# Patient Record
Sex: FEMALE | Race: WHITE | NOT HISPANIC OR LATINO | Employment: FULL TIME | URBAN - METROPOLITAN AREA
[De-identification: names, ages, dates, MRNs, and addresses within clinical notes are randomized per-mention and may not be internally consistent; named-entity substitution may affect disease eponyms.]

---

## 2017-01-25 ENCOUNTER — OFFICE VISIT (OUTPATIENT)
Dept: OPHTHALMOLOGY | Facility: CLINIC | Age: 57
End: 2017-01-25

## 2017-01-25 DIAGNOSIS — E11.9 DIABETES MELLITUS TYPE 2 WITHOUT RETINOPATHY (H): Primary | ICD-10-CM

## 2017-01-25 DIAGNOSIS — H52.4 PRESBYOPIA: ICD-10-CM

## 2017-01-25 ASSESSMENT — REFRACTION_MANIFEST
OD_AXIS: 30
OS_CYLINDER: +0.75
OS_SPHERE: -2.00
OD_CYLINDER: +1.00
OD_ADD: +2.50
OD_SPHERE: -2.25
OS_ADD: +2.50
OS_AXIS: 165

## 2017-01-25 ASSESSMENT — VISUAL ACUITY
OS_CC: 20/30
OD_CC: J1
OS_CC+: -1
OS_CC: J1
OD_CC: 20/25
METHOD: SNELLEN - LINEAR

## 2017-01-25 ASSESSMENT — REFRACTION_WEARINGRX
OD_SPHERE: -1.75
OS_AXIS: 3
OD_CYLINDER: +1.00
OS_CYLINDER: +0.25
SPECS_TYPE: PAL
OD_ADD: +2.50
OS_SPHERE: -1.75
OD_AXIS: 20

## 2017-01-25 ASSESSMENT — TONOMETRY
OS_IOP_MMHG: 15
OD_IOP_MMHG: 17
IOP_METHOD: TONOPEN

## 2017-01-25 ASSESSMENT — CONF VISUAL FIELD
OD_NORMAL: 1
OS_NORMAL: 1

## 2017-01-25 ASSESSMENT — CUP TO DISC RATIO
OD_RATIO: 0.2
OS_RATIO: 0.2

## 2017-01-25 ASSESSMENT — EXTERNAL EXAM - RIGHT EYE: OD_EXAM: NORMAL

## 2017-01-25 ASSESSMENT — EXTERNAL EXAM - LEFT EYE: OS_EXAM: NORMAL

## 2017-01-25 ASSESSMENT — SLIT LAMP EXAM - LIDS
COMMENTS: NORMAL
COMMENTS: NORMAL

## 2017-01-25 NOTE — MR AVS SNAPSHOT
After Visit Summary   1/25/2017    Nhung Russell    MRN: [id number]           Patient Information     Date Of Birth          1960        Visit Information        Provider Department      1/25/2017 8:00 AM Kelle Cifuentes MD [facility]        Today's Diagnoses     Diabetes mellitus type 2 without retinopathy (H)    -  1     Presbyopia           Care Instructions    Patient will return to clinic in 1 year with repeat dilated eye exam.          Follow-ups after your visit        Who to contact     Please call your clinic at [phone] to:    Ask questions about your health    Make or cancel appointments    Discuss your medicines    Learn about your test results    Speak to your doctor   If you have compliments or concerns about an experience at your clinic, or if you wish to file a complaint, please contact [facility] Patient Relations at [phone] or email us at [email or web address]         Additional Information About Your Visit        Care EveryWhere ID     This is your Care EveryWhere ID. This could be used by other organizations to access your [place] medical records  [id number]         Blood Pressure from Last 3 Encounters:   No data found for BP    Weight from Last 3 Encounters:   No data found for Wt              We Performed the Following     REFRACTION        Primary Care Provider Office Phone # Fax #    Petty Carrillo [phone]       [facility] [address] [place] [address]        Thank you!     Thank you for choosing [facility]  for your care. Our goal is always to provide you with excellent care. Hearing back from our patients is one way we can continue to improve our services. Please take a few minutes to complete the written survey that you may receive in the mail after your visit with us. Thank you!             Your Updated  Medication List - Protect others around you: Learn how to safely use, store and throw away your medicines at www.disposemymeds.org.          This list is accurate as of: 1/25/17  8:47 AM.  Always use your most recent med list.                   Brand Name Dispense Instructions for use    GLIPIZIDE PO          LIPITOR PO          METFORMIN HCL PO

## 2017-01-25 NOTE — NURSING NOTE
Chief Complaints and History of Present Illnesses   Patient presents with     Eye Exam For Diabetes     No VA concerns     HPI    Symptoms:              Comments:  No results found for this basename: a1c  6.9 was last A1C:  No VA concerns  Gosia DEVLIN 8:16 AM January 25, 2017

## 2017-01-25 NOTE — PROGRESS NOTES
1)DM s DR  2)ZULEIKA on CPAP  3)Presbyopia  4)NS OU    Patient will return to clinic in 1 year with repeat dilated eye exam.

## 2018-01-30 ENCOUNTER — OFFICE VISIT (OUTPATIENT)
Dept: OPHTHALMOLOGY | Facility: CLINIC | Age: 58
End: 2018-01-30
Payer: COMMERCIAL

## 2018-01-30 DIAGNOSIS — H52.4 PRESBYOPIA OF BOTH EYES: ICD-10-CM

## 2018-01-30 DIAGNOSIS — H52.13 MYOPIA, BILATERAL: ICD-10-CM

## 2018-01-30 DIAGNOSIS — E11.9 TYPE 2 DIABETES MELLITUS WITHOUT OPHTHALMIC MANIFESTATIONS (H): Primary | ICD-10-CM

## 2018-01-30 DIAGNOSIS — H25.13 NUCLEAR SCLEROTIC CATARACT OF BOTH EYES: ICD-10-CM

## 2018-01-30 DIAGNOSIS — H04.123 DRY EYES, BILATERAL: ICD-10-CM

## 2018-01-30 ASSESSMENT — REFRACTION_WEARINGRX
OD_CYLINDER: +1.25
OD_ADD: +2.50
OS_SPHERE: -1.25
OS_AXIS: 005
OD_AXIS: 018
OS_CYLINDER: +0.25
SPECS_TYPE: PAL
OD_SPHERE: -1.50
OS_ADD: +2.50

## 2018-01-30 ASSESSMENT — CONF VISUAL FIELD
OS_NORMAL: 1
METHOD: COUNTING FINGERS
OD_NORMAL: 1

## 2018-01-30 ASSESSMENT — REFRACTION_MANIFEST
OS_ADD: +2.50
OS_CYLINDER: +0.75
OS_AXIS: 165
OD_CYLINDER: +1.00
OS_SPHERE: -2.00
OD_SPHERE: -2.50
OD_ADD: +2.50
OD_AXIS: 030

## 2018-01-30 ASSESSMENT — VISUAL ACUITY
OS_CC: 20/25
CORRECTION_TYPE: GLASSES
OD_CC+: -1
OD_CC: 20/25
METHOD: SNELLEN - LINEAR

## 2018-01-30 ASSESSMENT — SLIT LAMP EXAM - LIDS
COMMENTS: NORMAL
COMMENTS: NORMAL

## 2018-01-30 ASSESSMENT — CUP TO DISC RATIO
OD_RATIO: 0.2
OS_RATIO: 0.2

## 2018-01-30 ASSESSMENT — EXTERNAL EXAM - LEFT EYE: OS_EXAM: NORMAL

## 2018-01-30 ASSESSMENT — EXTERNAL EXAM - RIGHT EYE: OD_EXAM: NORMAL

## 2018-01-30 ASSESSMENT — TONOMETRY
OS_IOP_MMHG: 15
IOP_METHOD: TONOPEN
OD_IOP_MMHG: 18

## 2018-01-30 NOTE — MR AVS SNAPSHOT
After Visit Summary   2018    Nhung Russell    MRN: [id number]           Patient Information     Date Of Birth          1960        Visit Information        Provider Department      2018 8:00 AM Lupe Soliz MD [facility]        Today's Diagnoses     Type 2 diabetes mellitus without ophthalmic manifestations (H) - Both Eyes    -  1    Dry eyes, bilateral - Both Eyes        Nuclear sclerotic cataract of both eyes - Both Eyes        Myopia, bilateral - Both Eyes        Presbyopia of both eyes - Both Eyes           Follow-ups after your visit        Follow-up notes from your care team     Return in about 1 month (around 2018) for follow up- dry eye, recheck mrx from last visit since dry eye caused difficulty last exam.      Who to contact     Please call your clinic at [phone] to:    Ask questions about your health    Make or cancel appointments    Discuss your medicines    Learn about your test results    Speak to your doctor   If you have compliments or concerns about an experience at your clinic, or if you wish to file a complaint, please contact [facility] Patient Relations at [phone] or email us at [email or web address]         Additional Information About Your Visit        MyChart Information     [employer] is an electronic gateway that provides easy, online access to your medical records. With [employer], you can request a clinic appointment, read your test results, renew a prescription or communicate with your care team.     To sign up for [employer] visit the website at [email or web address]   You will be asked to enter the access code listed below, as well as some personal information. Please follow the directions to create your username and password.     Your access code is: [email or web address]  Expires: 2018  6:30 AM     Your access code will  in 90 days. If you need help or a  new code, please contact your [facility] or call [phone] for assistance.        Care EveryWhere ID     This is your Care EveryWhere ID. This could be used by other organizations to access your [place] medical records  [id number]         Blood Pressure from Last 3 Encounters:   No data found for BP    Weight from Last 3 Encounters:   No data found for Wt              Today, you had the following     No orders found for display       Primary Care Provider Office Phone # Fax #    Petty Carrillo [phone]       [facility] [address] [place] [address]        Equal Access to Services     [facility]: Hadii aad ku hadasho Soomaali, waaxda luqadaha, qaybta kaalmada adeegyada, waxay idiin hayaan adeeg kharabarber randolph . So [facility] [phone].    ATENCIÓN: Si habla español, tiene a cunningham disposición servicios gratuitos de asistencia lingüística. Llame al [phone].    We comply with applicable federal civil rights laws and [place] laws. We do not discriminate on the basis of race, color, national origin, age, disability, sex, sexual orientation, or gender identity.            Thank you!     Thank you for choosing [facility]  for your care. Our goal is always to provide you with excellent care. Hearing back from our patients is one way we can continue to improve our services. Please take a few minutes to complete the written survey that you may receive in the mail after your visit with us. Thank you!             Your Updated Medication List - Protect others around you: Learn how to safely use, store and throw away your medicines at www.disposemymeds.org.          This list is accurate as of 1/30/18  1:28 PM.  Always use your most recent med list.                   Brand Name Dispense Instructions for use Diagnosis    GLIPIZIDE PO           LIPITOR PO           METFORMIN HCL PO           VICTOZA SC      Inject  Subcutaneous daily Pt states uses 0.6

## 2018-01-30 NOTE — NURSING NOTE
Chief Complaints and History of Present Illnesses   Patient presents with     Diabetic Eye Exam     HPI    Affected eye(s):  Both   Symptoms:     No decreased vision   No floaters   No flashes   Dryness (Comment: notes much increased dryness within the last 2 to 4 weeks / not using art tears)      Duration:  1 year   Frequency:  Constant       Do you have eye pain now?:  No      Comments:  A1C: 6.5 about 2 months  Avg FBS: about 130.  Jeannie EL 8:15 AM 01/30/2018

## 2018-01-30 NOTE — LETTER
Regarding: Nhung Russell    YOB: 1960    MRN: [id number]      Dear Colleague,     It was my pleasure to evaluate Nhung Russell on 1/30/2018 in our eye clinic. I have the last reported hemoglobin A1c as No results found for: A1C.    Pertinent exam findings today included:  Visual Acuity (Snellen - Linear)      Right Left   Dist cc 20/25 -1 20/25       Correction:  Glasses    Encouraged blinking         Tonometry (Tonopen, 8:11 AM)      Right Left   Pressure 18 15            Main Ophthalmology Exam     External Exam      Right Left    External Normal Normal      Slit Lamp Exam      Right Left    Lids/Lashes Normal Normal    Conjunctiva/Sclera White and quiet, diffuse conj staining White and quiet, diffuse conj staining    Cornea Clear, rapid TBUT Clear, rapid TBUT    Anterior Chamber Deep and quiet Deep and quiet    Iris Round and reactive Round and reactive    Lens tr NS tr NS    Vitreous Normal Normal      Fundus Exam      Right Left    Disc Normal Normal    C/D Ratio 0.2 0.2    Macula Normal Normal    Vessels Normal Normal    Periphery Normal, no DR Normal, no DR                There was no diabetic retinopathy seen on dilated exam today.      I have encouraged Nhung to continue working with you to maintain tight control of blood glucose and blood pressure.  Thank you for the opportunity to care for Nhung, and if you would like to discuss anything further, please do not hesitate to contact me.  I have asked her to return in about 1 month for dry eye follow-up and refraction for glasses and then in a year for her dilated fundus exam for diabetes mellitus.           Best regards,          Lupe Soliz MD        Comprehensive Ophthalmology          [facility]

## 2018-01-30 NOTE — PROGRESS NOTES
HPI  Nhung Russell is a 57 year old here for comprehensive eye exam for diabetes mellitus.  Happy with vision both eyes with current glasses.  Denies eye pain or flashes/floaters.  Has occasional dry eye sensation and irritation both eyes. Worse over the last few months, has not tried artificial tear drops. Diabetes has been well controlled, reports a hemoglobin a1c as 6.5%.       PMH: diabetes mellitus 2 on oral meds, hyperlipidemia   POH: Glasses for myopia/astig/presbyopia, no surgery, no trauma  Oc Meds: none  FH: Denies any glaucoma, age related macular degeneration, or other known eye diseases         Assessment & Plan        (E11.9) Type 2 diabetes mellitus without ophthalmic manifestations (H) - Both Eyes  (primary encounter diagnosis)  Comment: Diabetes Mellitus 2 w/o retinopathy   Controlled with 6.5% A1C  Plan:   Discussed the importance of tight blood glucose, blood pressure, and cholesterol  control in the prevention of diabetic retinopathy. Recommend yearly dilated eye exam. Letter to PCP sent.    (H04.123) Dry eyes, bilateral - Both Eyes  Comment: mild, rapid tear break up time, no cornea signs  Plan: trial of artificial tear drops four times a day as needed and preservative-free artificial tears if more than 6 x per day.  Recheck in 1 month.    (H25.13) Nuclear sclerotic cataract of both eyes - Both Eyes  Comment: early, not visually significant, counseled patient that it could cause mild glare/halos and refractive changes over time that can be compensated with new glasses   Plan: new prescription for glasses would help a little, recheck after dry eye addressed    (H52.13) Myopia, bilateral - Both Eyes/(H52.4) Presbyopia OU - Both Eyes  Comment: Mild change in prescription- needs to be rechecked and duochrome once eyes more comfortable from dry eye standpoint (unsure of her answers on manifest refraction)  Plan:  Confirm manifest refraction and duochrome next  visit      -----------------------------------------------------------------------------------       Patient disposition:   Return in about 1 month (around 2/28/2018) for follow up- dry eye, recheck mrx from last visit since dry eye caused difficulty last exam. and patient to call sooner as needed.      Complete documentation of historical and exam elements from today's encounter can be found in the full encounter summary report (not reduplicated in this progress note). I personally obtained the chief complaint(s) and history of present illness.  I have confirmed and edited as necessary the CC, HPI, PMH/PSH, social history, FMH, ROS, and exam/neuro findings as obtained by the technician or others. I have examined this patient myself and I personally viewed the image(s) and studies listed above and the documentation reflects my findings and interpretation.

## 2019-01-29 ENCOUNTER — OFFICE VISIT (OUTPATIENT)
Dept: OPHTHALMOLOGY | Facility: CLINIC | Age: 59
End: 2019-01-29
Payer: COMMERCIAL

## 2019-01-29 DIAGNOSIS — H25.13 NUCLEAR SCLEROTIC CATARACT OF BOTH EYES: ICD-10-CM

## 2019-01-29 DIAGNOSIS — H04.123 DRY EYES, BILATERAL: ICD-10-CM

## 2019-01-29 DIAGNOSIS — E11.9 TYPE 2 DIABETES MELLITUS WITHOUT OPHTHALMIC MANIFESTATIONS (H): Primary | ICD-10-CM

## 2019-01-29 DIAGNOSIS — H43.813 PVD (POSTERIOR VITREOUS DETACHMENT), BILATERAL: ICD-10-CM

## 2019-01-29 DIAGNOSIS — H52.13 MYOPIA, BILATERAL: ICD-10-CM

## 2019-01-29 ASSESSMENT — REFRACTION_WEARINGRX
OS_ADD: +2.50
OS_CYLINDER: +0.25
OS_AXIS: 005
SPECS_TYPE: PAL
OD_AXIS: 018
OD_ADD: +2.50
OS_SPHERE: -1.25
OD_SPHERE: -1.50
OD_CYLINDER: +1.25

## 2019-01-29 ASSESSMENT — SLIT LAMP EXAM - LIDS
COMMENTS: NORMAL
COMMENTS: NORMAL

## 2019-01-29 ASSESSMENT — CONF VISUAL FIELD
OS_NORMAL: 1
METHOD: COUNTING FINGERS
OD_NORMAL: 1

## 2019-01-29 ASSESSMENT — REFRACTION_MANIFEST
OD_AXIS: 023
OS_AXIS: 174
OS_ADD: +2.50
OS_CYLINDER: +0.25
OD_SPHERE: -2.50
OS_SPHERE: -2.25
OD_CYLINDER: +1.50
OD_ADD: +2.50

## 2019-01-29 ASSESSMENT — CUP TO DISC RATIO
OD_RATIO: 0.2
OS_RATIO: 0.2

## 2019-01-29 ASSESSMENT — VISUAL ACUITY
OD_CC: 20/30-2
OS_CC: 20/40-
CORRECTION_TYPE: GLASSES
METHOD: SNELLEN - LINEAR

## 2019-01-29 ASSESSMENT — TONOMETRY
IOP_METHOD: TONOPEN
OD_IOP_MMHG: 17
OS_IOP_MMHG: 18

## 2019-01-29 ASSESSMENT — EXTERNAL EXAM - RIGHT EYE: OD_EXAM: NORMAL

## 2019-01-29 ASSESSMENT — EXTERNAL EXAM - LEFT EYE: OS_EXAM: NORMAL

## 2019-01-29 NOTE — PROGRESS NOTES
HPI  Nhung Russell is a 58 year old here for comprehensive eye exam for diabetes mellitus.  Blurry vision both eyes with current glasses for distance.  Denies eye pain or flashes/floaters.  Has occasional dry eye sensation.  Diabetes has been reasonably well controlled, reports a hemoglobin a1c as 67.2%.       PMH: diabetes mellitus 2 on oral meds, hyperlipidemia   POH: Glasses for myopia/astig/presbyopia, no surgery, no trauma  Oc Meds: none  FH: Denies any glaucoma, age related macular degeneration, or other known eye diseases         Assessment & Plan        (E11.9) Type 2 diabetes mellitus without ophthalmic manifestations (H) - Both Eyes  (primary encounter diagnosis)  Comment: Diabetes Mellitus 2 w/o retinopathy   Controlled with 7.2% A1C  Plan:   Discussed the importance of tight blood glucose, blood pressure, and cholesterol  control in the prevention of diabetic retinopathy. Recommend yearly dilated eye exam. Letter to PCP sent.    (H52.13) Myopia, bilateral - Both Eyes/(H52.4) Presbyopia OU - Both Eyes  Comment: Moderate change in prescription improves visual acuity   Plan:  Manifest refraction done and prescription for glasses given     (H25.13) Nuclear sclerotic cataract of both eyes - Both Eyes  Comment: early, not visually significant, counseled patient that it could cause mild glare/halos and refractive changes over time that can be compensated with new glasses   Plan: manifest refraction done and prescription for glasses given     (H04.123) Dry eyes, bilateral - Both Eyes  Comment: mild, rapid tear break up time, no cornea signs  Plan: trial of artificial tear drops four times a day as needed and preservative-free artificial tears if more than 6 x per day.  Patient liked Retaine.    (H43.099) PVD (posterior vitreous detachment), unspecified laterality - right Eye  Comment:  No Leonardo's sign, retinal tears, or detachment seen on exam today. ?new, patient asymptomatic   Plan:  Natural history of  posterior vitreous detachment as well as retinal tear/detachment symptoms discussed with patient.  Told to call for prompt dilated exam if these symptoms occur.      -----------------------------------------------------------------------------------       Patient disposition:   Return in about 1 year (around 1/29/2020) for Comprehensive Exam. and patient to call sooner as needed.      Complete documentation of historical and exam elements from today's encounter can be found in the full encounter summary report (not reduplicated in this progress note). I personally obtained the chief complaint(s) and history of present illness.  I have confirmed and edited as necessary the CC, HPI, PMH/PSH, social history, FMH, ROS, and exam/neuro findings as obtained by the technician or others. I have examined this patient myself and I personally viewed the image(s) and studies listed above and the documentation reflects my findings and interpretation.     Lupe Soliz

## 2019-01-29 NOTE — LETTER
Regarding: Nhung Russell    YOB: 1960    MRN: [id number]      Dear Dr. Carrillo,     It was my pleasure to evaluate Nhung Russell on 1/29/2019 in our eye clinic. I have the last reported hemoglobin A1c as 7.2%    Pertinent exam findings today included:  Visual Acuity (Snellen - Linear)       Right Left    Dist cc 20/30-2 20/40-    Correction:  Glasses       improves to 20/20 each eye with manifest refraction   Tonometry (Tonopen, 8:50 AM)       Right Left    Pressure 17 18         Main Ophthalmology Exam     External Exam       Right Left    External Normal Normal          Slit Lamp Exam       Right Left    Lids/Lashes Normal Normal    Conjunctiva/Sclera White and quiet White and quiet    Cornea Clear, rapid TBUT Clear, rapid TBUT    Anterior Chamber Deep and quiet Deep and quiet    Iris Round and reactive Round and reactive    Lens tr NS tr NS    Vitreous Posterior vitreous detachment Normal          Fundus Exam       Right Left    Disc Normal Normal    C/D Ratio 0.2 0.2    Macula Normal Normal    Vessels Normal Normal    Periphery Normal, no DR Normal, no DR                There was no diabetic retinopathy seen on dilated exam today.      I have encouraged Nhung to continue working with you to maintain tight control of blood glucose and blood pressure.  Thank you for the opportunity to care for Nhung, and if you would like to discuss anything further, please do not hesitate to contact me.  I have asked her to return to clinic in about a year.          Best regards,          Lupe Soliz MD        Comprehensive Ophthalmology          [facility]

## 2019-05-29 ENCOUNTER — OFFICE VISIT (OUTPATIENT)
Dept: OPHTHALMOLOGY | Facility: CLINIC | Age: 59
End: 2019-05-29
Attending: OPHTHALMOLOGY
Payer: COMMERCIAL

## 2019-05-29 DIAGNOSIS — H11.31 SUBCONJUNCTIVAL HEMORRHAGE, RIGHT: Primary | ICD-10-CM

## 2019-05-29 PROCEDURE — G0463 HOSPITAL OUTPT CLINIC VISIT: HCPCS | Mod: ZF

## 2019-05-29 ASSESSMENT — CUP TO DISC RATIO
OD_RATIO: 0.2
OS_RATIO: 0.2

## 2019-05-29 ASSESSMENT — VISUAL ACUITY
OD_CC+: -2
OD_CC: 20/20
CORRECTION_TYPE: GLASSES
OS_CC: 20/30
METHOD: SNELLEN - LINEAR

## 2019-05-29 ASSESSMENT — CONF VISUAL FIELD
OD_NORMAL: 1
METHOD: COUNTING FINGERS
OS_NORMAL: 1

## 2019-05-29 ASSESSMENT — TONOMETRY
OS_IOP_MMHG: 16
OD_IOP_MMHG: 14
IOP_METHOD: TONOPEN

## 2019-05-29 ASSESSMENT — EXTERNAL EXAM - LEFT EYE: OS_EXAM: NORMAL

## 2019-05-29 ASSESSMENT — SLIT LAMP EXAM - LIDS
COMMENTS: NORMAL
COMMENTS: NORMAL

## 2019-05-29 ASSESSMENT — EXTERNAL EXAM - RIGHT EYE: OD_EXAM: NORMAL

## 2019-05-29 NOTE — NURSING NOTE
Chief Complaints and History of Present Illnesses   Patient presents with     Red Eye Right Eye     Chief Complaint(s) and History of Present Illness(es)     Red Eye Right Eye     Laterality: right eye    Characteristics: red blood on eye    Pain scale: 0/10    Frequency: constantly    Duration: days    Treatments tried: artificial tears              Comments     Started Friday night after rubbing it.  States that it has decreased a bit but there are some spot that are still there.  +FBS  +RUL is a bit swollen  Janet Amaya COT 7:57 AM May 29, 2019

## 2019-05-29 NOTE — PROGRESS NOTES
HPI  Nhung Russell is a 58 year old here for red eye after rubbing her eye over the weekend. Last eye exam 1/2019 with Dr. Soliz. Denies eye pain or flashes/floaters.  Has occasional dry eye and FBS.  Diabetes has been reasonably well controlled.      Takes baby aspirin.      PMH: diabetes mellitus 2 on oral meds, hyperlipidemia   POH: Glasses for myopia/astig/presbyopia, no surgery, no trauma  Oc Meds: none  FH: Denies any glaucoma, age related macular degeneration, or other known eye diseases         Assessment & Plan     Subconjunctival hemorrhage  OD   - ATs    - Continue Aspirin per PCP   - Return as needed    NOT ADDRESSED TODAY:  (E11.9) Type 2 diabetes mellitus without ophthalmic manifestations (H) - Both Eyes  (primary encounter diagnosis)  Comment: Diabetes Mellitus 2 w/o retinopathy   Controlled with 7.2% A1C  Plan:   Discussed the importance of tight blood glucose, blood pressure, and cholesterol  control in the prevention of diabetic retinopathy. Recommend yearly dilated eye exam. Letter to PCP sent.    (H52.13) Myopia, bilateral - Both Eyes/(H52.4) Presbyopia OU - Both Eyes  Comment: Moderate change in prescription improves visual acuity   Plan:  Manifest refraction done and prescription for glasses given     (H25.13) Nuclear sclerotic cataract of both eyes - Both Eyes  Comment: early, not visually significant, counseled patient that it could cause mild glare/halos and refractive changes over time that can be compensated with new glasses   Plan: manifest refraction done and prescription for glasses given     (H04.123) Dry eyes, bilateral - Both Eyes  Comment: mild, rapid tear break up time, no cornea signs  Plan: trial of artificial tear drops four times a day as needed and preservative-free artificial tears if more than 6 x per day.  Patient liked Retaine.    (H43.819) PVD (posterior vitreous detachment), unspecified laterality - right Eye  Comment:  No Leonardo's sign, retinal tears, or  detachment seen on exam today. ?new, patient asymptomatic   Plan:  Natural history of posterior vitreous detachment as well as retinal tear/detachment symptoms discussed with patient.  Told to call for prompt dilated exam if these symptoms occur.      -----------------------------------------------------------------------------------       Patient disposition:   Return in about 1 year (around 5/29/2020) for Sooner PRN. and patient to call sooner as needed.      Abimael Chirinos M.D.  PGY-3, Ophthalmology    Teaching statement:  Complete documentation of historical and exam elements from today's encounter can be found in the full encounter summary report (not reduplicated in this progress note). I personally obtained the chief complaint(s) and history of present illness.  I confirmed and edited as necessary the review of systems, past medical/surgical history, family history, social history, and examination findings as documented by others; and I examined the patient myself. I personally reviewed the relevant tests, images, and reports as documented above.     I formulated and edited as necessary the assessment and plan and discussed the findings and management plan with the patient and family.    Samanta Cardenas MD  Comprehensive Ophthalmology & Ocular Pathology  Department of Ophthalmology and Visual Neurosciences  [email or web address]  Pager [phone]

## 2020-11-23 ENCOUNTER — OFFICE VISIT (OUTPATIENT)
Dept: OPHTHALMOLOGY | Facility: CLINIC | Age: 60
End: 2020-11-23
Payer: COMMERCIAL

## 2020-11-23 DIAGNOSIS — H43.813 PVD (POSTERIOR VITREOUS DETACHMENT), BILATERAL: ICD-10-CM

## 2020-11-23 DIAGNOSIS — E11.9 TYPE 2 DIABETES MELLITUS WITHOUT OPHTHALMIC MANIFESTATIONS (H): Primary | ICD-10-CM

## 2020-11-23 DIAGNOSIS — H04.123 DRY EYES, BILATERAL: ICD-10-CM

## 2020-11-23 DIAGNOSIS — H52.13 MYOPIA, BILATERAL: ICD-10-CM

## 2020-11-23 PROCEDURE — 92015 DETERMINE REFRACTIVE STATE: CPT | Performed by: OPHTHALMOLOGY

## 2020-11-23 PROCEDURE — 92014 COMPRE OPH EXAM EST PT 1/>: CPT | Performed by: OPHTHALMOLOGY

## 2020-11-23 RX ORDER — AMLODIPINE BESYLATE 2.5 MG/1
TABLET ORAL
COMMUNITY
Start: 2020-10-20

## 2020-11-23 RX ORDER — ROSUVASTATIN CALCIUM 20 MG/1
TABLET, COATED ORAL
COMMUNITY
Start: 2020-09-15

## 2020-11-23 ASSESSMENT — REFRACTION_WEARINGRX
OD_SPHERE: -2.50
OD_AXIS: 023
OS_ADD: +2.50
OD_CYLINDER: +1.50
SPECS_TYPE: PAL
OS_CYLINDER: +0.25
OD_ADD: +2.50
OS_SPHERE: -2.25
OS_AXIS: 174

## 2020-11-23 ASSESSMENT — VISUAL ACUITY
OD_CC+: -2
OD_CC: 20/20
OS_CC: 20/20
OS_CC+: -2
METHOD: SNELLEN - LINEAR
CORRECTION_TYPE: GLASSES

## 2020-11-23 ASSESSMENT — CUP TO DISC RATIO
OS_RATIO: 0.2
OD_RATIO: 0.2

## 2020-11-23 ASSESSMENT — EXTERNAL EXAM - RIGHT EYE: OD_EXAM: NORMAL

## 2020-11-23 ASSESSMENT — TONOMETRY
OD_IOP_MMHG: 16
IOP_METHOD: TONOPEN
OS_IOP_MMHG: 15

## 2020-11-23 ASSESSMENT — CONF VISUAL FIELD
METHOD: COUNTING FINGERS
OS_NORMAL: 1
OD_NORMAL: 1

## 2020-11-23 ASSESSMENT — SLIT LAMP EXAM - LIDS
COMMENTS: DERMATOCHALASIS
COMMENTS: DERMATOCHALASIS

## 2020-11-23 ASSESSMENT — EXTERNAL EXAM - LEFT EYE: OS_EXAM: NORMAL

## 2020-11-23 NOTE — PROGRESS NOTES
HPI  Nhung Russell is a 60 year old here for comprehensive eye exam for diabetes mellitus.  Slight blurry vision both eyes with current glasses for distance.  In August she states that she started noticing a few more floaters (she is not sure which eye).  Both eyes seem intermittently dry.  Using artificial tears does resolve the discomfort.  Her last A1C was 10.1 on 9/10/2020.     PMH: diabetes mellitus 2 on oral meds, hyperlipidemia   POH: Glasses for myopia/astig/presbyopia, no surgery, no trauma  Oc Meds: none  FH: Denies any glaucoma, age related macular degeneration, or other known eye diseases         Assessment & Plan     (E11.9) Type 2 diabetes mellitus without ophthalmic manifestations (H) - Both Eyes  (primary encounter diagnosis)  Comment: Diabetes Mellitus 2 w/o retinopathy  Plan:   Discussed the importance of tight blood glucose, blood pressure, and cholesterol control in the prevention of diabetic retinopathy. Recommend yearly dilated eye exam. Letter to PCP sent.    (H25.13) Nuclear sclerotic cataract of both eyes - Both Eyes  Comment: early, not visually significant, cortical cataract right eye, counseled patient that it could cause mild glare/halos and refractive changes over time that can be compensated with new glasses   Plan: follow    (H52.13) Myopia, bilateral - Both Eyes  Comment: good visual acuity    Plan:  Manifest refraction done and prescription for glasses given    (H04.123) Dry eyes, bilateral - Both Eyes  Comment: mild  Plan: continue artificial tear drops as needed     (H43.819) PVD (posterior vitreous detachment), unspecified laterality - right Eye  Comment:  Old right eye - no new Leonardo's sign, retinal tears, or detachment seen on exam today both eyes .   Plan:  Natural history of posterior vitreous detachment as well as retinal tear/detachment symptoms discussed with patient.  Told to call for prompt dilated exam if these symptoms  occur.      -----------------------------------------------------------------------------------       Patient disposition:   Return in about 1 year (around 11/23/2021) for Comprehensive Exam. and patient to call sooner as needed.      Complete documentation of historical and exam elements from today's encounter can be found in the full encounter summary report (not reduplicated in this progress note). I personally obtained the chief complaint(s) and history of present illness.  I have confirmed and edited as necessary the CC, HPI, PMH/PSH, social history, FMH, ROS, and exam/neuro findings as obtained by the technician or others. I have examined this patient myself and I personally viewed the image(s) and studies listed above and the documentation reflects my findings and interpretation.     Lupe Soliz

## 2020-11-23 NOTE — LETTER
Regarding: Nhung Russell    YOB: 1960    MRN: [id number]      Dear Colleague,     It was my pleasure to evaluate Nhung Russell on 11/23/2020 in our eye clinic. I have the last reported hemoglobin A1c as 10%    Pertinent exam findings today included:  Visual Acuity (Snellen - Linear)       Right Left    Dist cc 20/20 -2 20/20 -2    Correction: Glasses         Tonometry (Tonopen, 9:00 AM)       Right Left    Pressure 16 15         Main Ophthalmology Exam     External Exam       Right Left    External Normal Normal          Slit Lamp Exam       Right Left    Lids/Lashes Dermatochalasis Dermatochalasis    Conjunctiva/Sclera White and quiet White and quiet    Cornea Clear, rapid TBUT Clear, rapid TBUT    Anterior Chamber Deep and quiet Deep and quiet    Iris Dilated Dilated    Lens 1+ Nuclear sclerosis cataract inf ACC 1+ Nuclear sclerosis cataract    Vitreous Posterior vitreous detachment Normal          Fundus Exam       Right Left    Disc Normal Normal    C/D Ratio 0.2 0.2    Macula Normal Normal    Vessels Normal Normal    Periphery Normal, attached, no DR  Normal, attached, no DR                 There was no diabetic retinopathy seen on dilated exam today.      I have encouraged Nhung to continue working with you to maintain tight control of blood glucose and blood pressure.  Thank you for the opportunity to care for Nhung, and if you would like to discuss anything further, please do not hesitate to contact me.  I have asked her to return to clinic in a year.          Best regards,          Lupe Soliz MD        Comprehensive Ophthalmology                [facility]        [facility]

## 2021-12-02 ENCOUNTER — OFFICE VISIT (OUTPATIENT)
Dept: OPHTHALMOLOGY | Facility: CLINIC | Age: 61
End: 2021-12-02
Payer: COMMERCIAL

## 2021-12-02 DIAGNOSIS — H43.813 PVD (POSTERIOR VITREOUS DETACHMENT), BILATERAL: ICD-10-CM

## 2021-12-02 DIAGNOSIS — E11.9 TYPE 2 DIABETES MELLITUS WITHOUT OPHTHALMIC MANIFESTATIONS (H): Primary | ICD-10-CM

## 2021-12-02 DIAGNOSIS — H02.834 DERMATOCHALASIS OF BOTH UPPER EYELIDS: ICD-10-CM

## 2021-12-02 DIAGNOSIS — H25.13 NUCLEAR SCLEROTIC CATARACT OF BOTH EYES: ICD-10-CM

## 2021-12-02 DIAGNOSIS — H52.13 MYOPIA, BILATERAL: ICD-10-CM

## 2021-12-02 DIAGNOSIS — H02.831 DERMATOCHALASIS OF BOTH UPPER EYELIDS: ICD-10-CM

## 2021-12-02 DIAGNOSIS — H04.123 DRY EYES, BILATERAL: ICD-10-CM

## 2021-12-02 PROCEDURE — 92014 COMPRE OPH EXAM EST PT 1/>: CPT | Performed by: OPHTHALMOLOGY

## 2021-12-02 PROCEDURE — 92015 DETERMINE REFRACTIVE STATE: CPT | Performed by: OPHTHALMOLOGY

## 2021-12-02 RX ORDER — GLIPIZIDE 10 MG/1
TABLET, FILM COATED, EXTENDED RELEASE ORAL
COMMUNITY
Start: 2021-12-01

## 2021-12-02 RX ORDER — INSULIN GLARGINE 100 [IU]/ML
20 INJECTION, SOLUTION SUBCUTANEOUS
COMMUNITY
Start: 2021-07-21 | End: 2022-11-19

## 2021-12-02 RX ORDER — SEMAGLUTIDE 1.34 MG/ML
INJECTION, SOLUTION SUBCUTANEOUS
COMMUNITY
Start: 2021-10-18

## 2021-12-02 RX ORDER — BLOOD SUGAR DIAGNOSTIC
STRIP MISCELLANEOUS
COMMUNITY
Start: 2021-09-24

## 2021-12-02 RX ORDER — INSULIN GLARGINE 100 [IU]/ML
INJECTION, SOLUTION SUBCUTANEOUS
COMMUNITY
Start: 2021-11-13

## 2021-12-02 ASSESSMENT — VISUAL ACUITY
OD_CC: 20/25
METHOD: SNELLEN - LINEAR
OS_CC: 20/20 SLOW
CORRECTION_TYPE: GLASSES
OD_CC+: -3

## 2021-12-02 ASSESSMENT — REFRACTION_MANIFEST
OD_ADD: +2.50
OS_AXIS: 172
OD_AXIS: 025
OS_ADD: +2.50
OS_CYLINDER: +1.25
OD_CYLINDER: +1.50
OD_SPHERE: -1.75
OS_SPHERE: -2.00

## 2021-12-02 ASSESSMENT — CONF VISUAL FIELD
OD_NORMAL: 1
METHOD: COUNTING FINGERS
OS_NORMAL: 1

## 2021-12-02 ASSESSMENT — TONOMETRY
IOP_METHOD: ICARE
OD_IOP_MMHG: 16
OS_IOP_MMHG: 15

## 2021-12-02 ASSESSMENT — REFRACTION_WEARINGRX
SPECS_TYPE: PAL
OD_SPHERE: -2.50
OS_SPHERE: -2.25
OS_ADD: +2.50
OS_CYLINDER: +0.25
OD_CYLINDER: +1.50
OD_ADD: +2.50
OD_AXIS: 023
OS_AXIS: 174

## 2021-12-02 ASSESSMENT — EXTERNAL EXAM - RIGHT EYE: OD_EXAM: NORMAL

## 2021-12-02 ASSESSMENT — CUP TO DISC RATIO
OS_RATIO: 0.2
OD_RATIO: 0.2

## 2021-12-02 ASSESSMENT — EXTERNAL EXAM - LEFT EYE: OS_EXAM: NORMAL

## 2021-12-02 NOTE — NURSING NOTE
Chief Complaints and History of Present Illnesses   Patient presents with     Diabetic Eye Exam     Chief Complaint(s) and History of Present Illness(es)     Diabetic Eye Exam     Associated symptoms: floaters (historic - no changes ).  Negative for itching, redness, flashes and tearing    Diabetes Type: Type 2 and on insulin    Duration: years    Treatments tried: artificial tears and glasses    Pain scale: 0/10              Comments     Pt here today for routine diabetic eye examination.  DM2 treated with oral meds or insulin. BS =    JOSE R was last year 11/23/2020.  States no eye health or vision concerns.    Ocular meds = none    Margo THOMPSON, ZULEIKA 3:20 PM 12/02/2021

## 2021-12-02 NOTE — PROGRESS NOTES
HPI  Nhung Russell is a 61 year old here for comprehensive eye exam for diabetes mellitus.  Slight blurry vision both eyes with current glasses for distance.  Her last A1C was 8.3%.  Complains of upper lids blocking her vision.     PMH: diabetes mellitus 2 on oral meds, hyperlipidemia , cpap  POH: Glasses for myopia/astig/presbyopia, no surgery, no trauma  Oc Meds: refresh at night in the past  FH: Denies any glaucoma, age related macular degeneration, or other known eye diseases         Assessment & Plan     (E11.9) Type 2 diabetes mellitus without ophthalmic manifestations (H) - Both Eyes  (primary encounter diagnosis)  Comment: Diabetes Mellitus 2 w/o retinopathy  Plan:   Discussed the importance of tight blood glucose, blood pressure, and cholesterol control in the prevention of diabetic retinopathy. Recommend yearly dilated eye exam. Letter to PCP sent.    (H25.13) Nuclear sclerotic cataract of both eyes - Both Eyes  Comment: early, not visually significant, cortical cataract right eye, counseled patient that it could cause mild glare/halos and refractive changes over time that can be compensated with new glasses   Plan: follow    (H04.123) Dry eyes, bilateral - Both Eyes  Comment: mild  Plan: continue artificial tear drops as needed     (H43.819) PVD (posterior vitreous detachment), unspecified laterality - ou  Comment:  Stable    Plan:  Natural history of posterior vitreous detachment as well as retinal tear/detachment symptoms discussed with patient.  Told to call for prompt dilated exam if these symptoms occur.    (H52.13) Myopia, bilateral - Both Eyes  Comment: good visual acuity  , small changes   Plan:  Manifest refraction done and prescription for glasses given    (H02.831,  H02.834) Dermatochalasis of both upper eyelids  Comment: moderate, symptomatic  Plan: refer to Oculoplastics for consult      -----------------------------------------------------------------------------------       Patient  disposition:   Return in about 1 year (around 12/2/2022) for Comp Exam-pt will call sooner for oculoplastics Dr. Jackson or Rafiq for BULB consult. Patient to call sooner as needed.      Complete documentation of historical and exam elements from today's encounter can be found in the full encounter summary report (not reduplicated in this progress note). I personally obtained the chief complaint(s) and history of present illness.  I have confirmed and edited as necessary the CC, HPI, PMH/PSH, social history, FMH, ROS, and exam/neuro findings as obtained by the technician or others. I have examined this patient myself and I personally viewed the image(s) and studies listed above and the documentation reflects my findings and interpretation.     Lupe Soliz

## 2021-12-13 ASSESSMENT — SLIT LAMP EXAM - LIDS
COMMENTS: DERMATOCHALASIS, NO PTOSIS
COMMENTS: DERMATOCHALASIS, NO PTOSIS

## 2022-12-06 ENCOUNTER — OFFICE VISIT (OUTPATIENT)
Dept: OPHTHALMOLOGY | Facility: CLINIC | Age: 62
End: 2022-12-06
Payer: COMMERCIAL

## 2022-12-06 DIAGNOSIS — E11.9 TYPE 2 DIABETES MELLITUS WITHOUT OPHTHALMIC MANIFESTATIONS (H): Primary | ICD-10-CM

## 2022-12-06 DIAGNOSIS — H01.00B BLEPHARITIS OF UPPER AND LOWER EYELIDS OF BOTH EYES, UNSPECIFIED TYPE: ICD-10-CM

## 2022-12-06 DIAGNOSIS — H25.019 CORTICAL SENILE CATARACT, UNSPECIFIED LATERALITY: ICD-10-CM

## 2022-12-06 DIAGNOSIS — H01.00A BLEPHARITIS OF UPPER AND LOWER EYELIDS OF BOTH EYES, UNSPECIFIED TYPE: ICD-10-CM

## 2022-12-06 DIAGNOSIS — H43.813 PVD (POSTERIOR VITREOUS DETACHMENT), BILATERAL: ICD-10-CM

## 2022-12-06 DIAGNOSIS — H52.13 MYOPIA, BILATERAL: ICD-10-CM

## 2022-12-06 PROCEDURE — 92014 COMPRE OPH EXAM EST PT 1/>: CPT | Performed by: OPHTHALMOLOGY

## 2022-12-06 PROCEDURE — 92015 DETERMINE REFRACTIVE STATE: CPT | Performed by: OPHTHALMOLOGY

## 2022-12-06 ASSESSMENT — CONF VISUAL FIELD
OD_INFERIOR_TEMPORAL_RESTRICTION: 0
OS_SUPERIOR_TEMPORAL_RESTRICTION: 0
OS_INFERIOR_TEMPORAL_RESTRICTION: 0
OS_INFERIOR_NASAL_RESTRICTION: 0
OS_NORMAL: 1
METHOD: COUNTING FINGERS
OS_SUPERIOR_NASAL_RESTRICTION: 0
OD_NORMAL: 1
OD_SUPERIOR_TEMPORAL_RESTRICTION: 0
OD_INFERIOR_NASAL_RESTRICTION: 0
OD_SUPERIOR_NASAL_RESTRICTION: 0

## 2022-12-06 ASSESSMENT — TONOMETRY
OS_IOP_MMHG: 18
OD_IOP_MMHG: 18
IOP_METHOD: ICARE

## 2022-12-06 ASSESSMENT — CUP TO DISC RATIO
OD_RATIO: 0.2
OS_RATIO: 0.2

## 2022-12-06 ASSESSMENT — VISUAL ACUITY
OD_CC: 20/20
CORRECTION_TYPE: GLASSES
OS_CC+: -2
OS_CC: 20/20
METHOD: SNELLEN - LINEAR
OD_CC+: -3

## 2022-12-06 ASSESSMENT — EXTERNAL EXAM - LEFT EYE: OS_EXAM: NORMAL

## 2022-12-06 ASSESSMENT — EXTERNAL EXAM - RIGHT EYE: OD_EXAM: NORMAL

## 2022-12-06 ASSESSMENT — REFRACTION_WEARINGRX
OS_CYLINDER: +0.25
OS_AXIS: 174
OD_SPHERE: -2.50
OD_CYLINDER: +1.50
OD_AXIS: 023
OS_SPHERE: -2.25
OD_ADD: +2.50
OS_ADD: +2.50
SPECS_TYPE: PAL

## 2022-12-06 ASSESSMENT — REFRACTION_MANIFEST
OS_SPHERE: -2.25
OD_AXIS: 025
OS_ADD: +2.50
OD_ADD: +2.50
OS_AXIS: 172
OD_SPHERE: -1.75
OD_CYLINDER: +1.50
OS_CYLINDER: +1.50

## 2022-12-06 NOTE — NURSING NOTE
Chief Complaints and History of Present Illnesses   Patient presents with     Annual Eye Exam     Chief Complaint(s) and History of Present Illness(es)     Annual Eye Exam            Laterality: both eyes    Associated symptoms: Negative for eye pain, redness, flashes and itching    Treatments tried: artificial tears    Pain scale: 0/10          Comments    Patient present for annual exam. Patient did not update glasses last year. Patient's glasses are 4 years old. No complaints at this time other than needing new glasses. Patient has floaters but they are not new.  Patient's last A1C: taken last week 8.7    DONNA Braun December 6, 2022 9:07 AM

## 2022-12-06 NOTE — PROGRESS NOTES
HPI  Nhung Russell is a 62 year old here for comprehensive eye exam for diabetes mellitus.    HPI     Annual Eye Exam    In both eyes.  Associated symptoms include Negative for eye pain, redness, flashes and itching.  Treatments tried include artificial tears.  Pain was noted as 0/10.           Comments    Patient present for annual exam. Patient did not update glasses last year. Patient's glasses are 4 years old. No complaints at this time other than needing new glasses. Patient has floaters but they are not new.  Patient's last A1C: taken last week 8.7    DONNA Braun December 6, 2022 9:07 AM           Last edited by Latoya Ordoñez on 12/6/2022  9:09 AM.        Her last A1C was 8.7%.       PMH: diabetes mellitus 2 on oral meds, hyperlipidemia , cpap    POH: Glasses for myopia/astig/presbyopia, cataracts both eyes, posterior vitreous detachment both eyes, no surgery, no trauma  Oc Meds: refresh at night in the past  FH: Denies any glaucoma, age related macular degeneration, or other known eye diseases         Assessment & Plan     (E11.9) Type 2 diabetes mellitus without ophthalmic manifestations (H) - Both Eyes  (primary encounter diagnosis)  Comment: Diabetes Mellitus 2 w/o retinopathy  Plan:   Discussed the importance of tight blood glucose, blood pressure, and cholesterol control in the prevention of diabetic retinopathy. Recommend yearly dilated eye exam.     (H25.019) Cortical senile cataract, unspecified laterality - Both Eyes  Comment: early nuclear sclerotic cataract,minimally visually significant, cortical cataract right eye, counseled patient that it could cause mild glare/halos and refractive changes over time that can be compensated with new glasses   Plan: follow    (H04.123) Dry eyes, bilateral - Both Eyes  Comment: mild  Plan: continue artificial tear drops as needed     (H43.819) PVD (posterior vitreous detachment), unspecified laterality - ou  Comment:  Stable    Plan:  Natural history of  posterior vitreous detachment as well as retinal tear/detachment symptoms discussed with patient.  Told to call for prompt dilated exam if these symptoms occur.    (H52.13) Myopia, bilateral - Both Eyes  Comment: good visual acuity  , small changes (decrease myopic correction)  Plan:  Manifest refraction done and prescription for glasses given    (H01.00A,  H01.00B) Blepharitis of upper and lower eyelids of both eyes, unspecified type - Both Eyes  Comment:  Mild signs, minimal symptoms   Plan:   Warm compresses daily   Lid scrubs with baby shampoo or ocusoft foam on warm washcloth to clean lid margins carefully from side to side    -----------------------------------------------------------------------------------       Patient disposition:   Return in about 1 year (around 12/6/2023) for Comprehensive Exam. Patient to call sooner as needed.      Complete documentation of historical and exam elements from today's encounter can be found in the full encounter summary report (not reduplicated in this progress note). I personally obtained the chief complaint(s) and history of present illness.  I have confirmed and edited as necessary the CC, HPI, PMH/PSH, social history, FMH, ROS, and exam/neuro findings as obtained by the technician or others. I have examined this patient myself and I personally viewed the image(s) and studies listed above and the documentation reflects my findings and interpretation.     Lupe Soliz

## 2024-01-16 ENCOUNTER — OFFICE VISIT (OUTPATIENT)
Dept: OPHTHALMOLOGY | Facility: CLINIC | Age: 64
End: 2024-01-16
Payer: COMMERCIAL

## 2024-01-16 DIAGNOSIS — H52.13 MYOPIA, BILATERAL: ICD-10-CM

## 2024-01-16 DIAGNOSIS — E11.9 TYPE 2 DIABETES MELLITUS WITHOUT OPHTHALMIC MANIFESTATIONS (H): Primary | ICD-10-CM

## 2024-01-16 DIAGNOSIS — H02.834 DERMATOCHALASIS OF BOTH UPPER EYELIDS: ICD-10-CM

## 2024-01-16 DIAGNOSIS — H02.88B MEIBOMIAN GLAND DYSFUNCTION (MGD) OF UPPER AND LOWER LIDS OF BOTH EYES: ICD-10-CM

## 2024-01-16 DIAGNOSIS — H02.88A MEIBOMIAN GLAND DYSFUNCTION (MGD) OF UPPER AND LOWER LIDS OF BOTH EYES: ICD-10-CM

## 2024-01-16 DIAGNOSIS — H43.813 PVD (POSTERIOR VITREOUS DETACHMENT), BILATERAL: ICD-10-CM

## 2024-01-16 DIAGNOSIS — H02.831 DERMATOCHALASIS OF BOTH UPPER EYELIDS: ICD-10-CM

## 2024-01-16 DIAGNOSIS — H25.13 NUCLEAR SCLEROTIC CATARACT OF BOTH EYES: ICD-10-CM

## 2024-01-16 PROCEDURE — 92015 DETERMINE REFRACTIVE STATE: CPT | Performed by: OPHTHALMOLOGY

## 2024-01-16 PROCEDURE — 92014 COMPRE OPH EXAM EST PT 1/>: CPT | Performed by: OPHTHALMOLOGY

## 2024-01-16 ASSESSMENT — CONF VISUAL FIELD
OS_INFERIOR_TEMPORAL_RESTRICTION: 0
OS_SUPERIOR_NASAL_RESTRICTION: 0
OD_INFERIOR_NASAL_RESTRICTION: 0
OS_NORMAL: 1
OS_INFERIOR_NASAL_RESTRICTION: 0
OD_SUPERIOR_NASAL_RESTRICTION: 0
OD_SUPERIOR_TEMPORAL_RESTRICTION: 0
OD_INFERIOR_TEMPORAL_RESTRICTION: 0
OS_SUPERIOR_TEMPORAL_RESTRICTION: 0
OD_NORMAL: 1

## 2024-01-16 ASSESSMENT — VISUAL ACUITY
OD_CC+: -1
OS_CC+: -1
METHOD: SNELLEN - LINEAR
CORRECTION_TYPE: GLASSES
OD_CC: 20/20
OS_CC: 20/20

## 2024-01-16 ASSESSMENT — REFRACTION_WEARINGRX
OD_SPHERE: -1.75
OD_ADD: +2.50
OS_ADD: +2.50
OD_CYLINDER: +1.50
OS_CYLINDER: +1.50
OD_AXIS: 025
OS_SPHERE: -2.25
OS_AXIS: 172
SPECS_TYPE: PAL

## 2024-01-16 ASSESSMENT — TONOMETRY
OS_IOP_MMHG: 19
OD_IOP_MMHG: 16
IOP_METHOD: ICARE

## 2024-01-16 ASSESSMENT — CUP TO DISC RATIO
OD_RATIO: 0.2
OS_RATIO: 0.2

## 2024-01-16 ASSESSMENT — EXTERNAL EXAM - LEFT EYE: OS_EXAM: NORMAL

## 2024-01-16 ASSESSMENT — REFRACTION_MANIFEST
OD_CYLINDER: +1.25
OD_AXIS: 030
OD_SPHERE: -1.75
OS_SPHERE: -2.00
OS_CYLINDER: +1.25
OD_ADD: +2.50
OS_ADD: +2.50
OS_AXIS: 175

## 2024-01-16 ASSESSMENT — EXTERNAL EXAM - RIGHT EYE: OD_EXAM: NORMAL

## 2024-01-16 NOTE — PROGRESS NOTES
HPI  Nhung Russell is a 63 year old here for comprehensive eye exam for diabetes mellitus.    HPI       Annual Eye Exam    In both eyes.  Associated symptoms include Negative for redness, jaw claudication, photophobia and foreign body sensation.             Comments    Patient present for routine exam. Patient denies any eye issues at this time. Patient's glasses are 2 years old.  DONNA Braun January 16, 2024 7:46 AM           Last edited by Latoya Ordoñez on 1/16/2024  8:00 AM.        Her last A1C was 7.2%.  Reports no eye discomfort.  No flashes/floaters.        PMH: diabetes mellitus 2 on oral meds, hyperlipidemia , cpap  Past Medical History:   Diagnosis Date    Diabetes (H)     High cholesterol     Hypertension       POH: Glasses for myopia/astig/presbyopia, cataracts both eyes, posterior vitreous detachment both eyes, no surgery, no trauma  Oc Meds: refresh at night in the past  FH: Denies any glaucoma, age related macular degeneration, or other known eye diseases         Assessment & Plan     (E11.9) Type 2 diabetes mellitus without ophthalmic manifestations (H) - Both Eyes  (primary encounter diagnosis)  Comment: Diabetes Mellitus 2 w/o retinopathy  Plan:   Discussed the importance of tight blood glucose, blood pressure, and cholesterol control in the prevention of diabetic retinopathy. Recommend yearly dilated eye exam.     Nuclear sclerotic cataract - Both Eyes  Comment: early nuclear sclerotic cataract,minimally visually significant, cortical cataract right eye, counseled patient that it could cause mild glare/halos and refractive changes over time that can be compensated with new glasses   Plan: follow    Dermatochalasis both eyes -  Asymptomatic   Oculoplastics referral if more symptoms     (H52.13) Myopia, bilateral - Both Eyes  Comment: good visual acuity  minimal changes  Plan:  Manifest refraction done and prescription for glasses given, update as needed     (H43.819) PVD (posterior vitreous  detachment), unspecified laterality - ou  Comment:  Stable    Plan:  Natural history of posterior vitreous detachment as well as retinal tear/detachment symptoms discussed with patient.  Told to call for prompt dilated exam if these symptoms occur.    (H02.88A,  H02.88B) Meibomian gland dysfunction (MGD) of upper and lower lids of both eyes - Both Eyes  Comment: mild   Plan: natural history discussed with patient   Warm compresses daily  Artificial tears 4-6 times per day as needed for discomfort        -----------------------------------------------------------------------------------       Patient disposition:   Return in about 1 year (around 1/16/2025) for Comprehensive Exam. Patient to call sooner as needed.      Complete documentation of historical and exam elements from today's encounter can be found in the full encounter summary report (not reduplicated in this progress note). I personally obtained the chief complaint(s) and history of present illness.  I have confirmed and edited as necessary the CC, HPI, PMH/PSH, social history, FMH, ROS, and exam/neuro findings as obtained by the technician or others. I have examined this patient myself and I personally viewed the image(s) and studies listed above and the documentation reflects my findings and interpretation.     Lupe Soliz

## 2024-01-16 NOTE — NURSING NOTE
Chief Complaints and History of Present Illnesses   Patient presents with    Annual Eye Exam     Chief Complaint(s) and History of Present Illness(es)       Annual Eye Exam              Laterality: both eyes    Associated symptoms: Negative for redness, jaw claudication, photophobia and foreign body sensation              Comments    Patient present for routine exam. Patient denies any eye issues at this time. Patient's glasses are 2 years old.  DONNA Braun January 16, 2024 7:46 AM

## 2024-12-02 ENCOUNTER — TELEPHONE (OUTPATIENT)
Dept: OPHTHALMOLOGY | Facility: CLINIC | Age: 64
End: 2024-12-02
Payer: COMMERCIAL

## 2024-12-02 NOTE — TELEPHONE ENCOUNTER
Spoke with patient regarding rescheduling due to provider template change.   Rescheduled patient accordingly and patient is aware of new appointment details.-Per Patient

## 2024-12-23 ENCOUNTER — TELEPHONE (OUTPATIENT)
Dept: OPHTHALMOLOGY | Facility: CLINIC | Age: 64
End: 2024-12-23
Payer: COMMERCIAL

## 2024-12-23 NOTE — TELEPHONE ENCOUNTER
Spoke with patient regarding rescheduling needed due to provider is out of clinic. Rescheduled patient accordingly and patient is aware of new date, time and location.-Per Patient

## 2025-01-28 ENCOUNTER — OFFICE VISIT (OUTPATIENT)
Dept: OPHTHALMOLOGY | Facility: CLINIC | Age: 65
End: 2025-01-28
Payer: COMMERCIAL

## 2025-01-28 DIAGNOSIS — H43.813 PVD (POSTERIOR VITREOUS DETACHMENT), BILATERAL: ICD-10-CM

## 2025-01-28 DIAGNOSIS — H52.13 MYOPIA, BILATERAL: ICD-10-CM

## 2025-01-28 DIAGNOSIS — H25.13 NUCLEAR SCLEROTIC CATARACT OF BOTH EYES: ICD-10-CM

## 2025-01-28 DIAGNOSIS — E11.9 TYPE 2 DIABETES MELLITUS WITHOUT OPHTHALMIC MANIFESTATIONS (H): Primary | ICD-10-CM

## 2025-01-28 ASSESSMENT — VISUAL ACUITY
METHOD: SNELLEN - LINEAR
OS_CC+: -2
CORRECTION_TYPE: GLASSES
OD_CC+: -3
OD_CC: 20/20
OS_CC: 20/20

## 2025-01-28 ASSESSMENT — CUP TO DISC RATIO
OD_RATIO: 0.2
OS_RATIO: 0.2

## 2025-01-28 ASSESSMENT — REFRACTION_WEARINGRX
OD_CYLINDER: +1.50
OS_SPHERE: -2.25
SPECS_TYPE: PAL
OD_SPHERE: -1.75
OS_CYLINDER: +1.50
OD_AXIS: 025
OS_ADD: +2.50
OD_ADD: +2.50
OS_AXIS: 172

## 2025-01-28 ASSESSMENT — TONOMETRY
OS_IOP_MMHG: 18
IOP_METHOD: ICARE
OD_IOP_MMHG: 16

## 2025-01-28 ASSESSMENT — CONF VISUAL FIELD
OD_SUPERIOR_TEMPORAL_RESTRICTION: 0
OS_NORMAL: 1
OD_INFERIOR_NASAL_RESTRICTION: 0
OS_INFERIOR_NASAL_RESTRICTION: 0
OS_SUPERIOR_NASAL_RESTRICTION: 0
OS_INFERIOR_TEMPORAL_RESTRICTION: 0
OD_INFERIOR_TEMPORAL_RESTRICTION: 0
METHOD: COUNTING FINGERS
OS_SUPERIOR_TEMPORAL_RESTRICTION: 0
OD_SUPERIOR_NASAL_RESTRICTION: 0
OD_NORMAL: 1

## 2025-01-28 ASSESSMENT — PATIENT HEALTH QUESTIONNAIRE - PHQ9
10. IF YOU CHECKED OFF ANY PROBLEMS, HOW DIFFICULT HAVE THESE PROBLEMS MADE IT FOR YOU TO DO YOUR WORK, TAKE CARE OF THINGS AT HOME, OR GET ALONG WITH OTHER PEOPLE: NOT DIFFICULT AT ALL
SUM OF ALL RESPONSES TO PHQ QUESTIONS 1-9: 0
SUM OF ALL RESPONSES TO PHQ QUESTIONS 1-9: 0

## 2025-01-28 ASSESSMENT — EXTERNAL EXAM - RIGHT EYE: OD_EXAM: NORMAL

## 2025-01-28 ASSESSMENT — EXTERNAL EXAM - LEFT EYE: OS_EXAM: NORMAL

## 2025-01-28 NOTE — PROGRESS NOTES
"HPI  Nhung Russell is a 64 year old here for comprehensive eye exam for diabetes mellitus.  Vision is stable.   HPI       COMPREHENSIVE EYE EXAM    Associated symptoms include Negative for dryness, eye pain, flashes and floaters.  Treatments tried include no treatments.  Pain was noted as 0/10. Additional comments: Here for a Comprehensive eye exam              Comments    Pt states VA has been stable since last visit.   Happy with vision in current glasses.   Denies eye pain or discomfort.   Ocular Meds: None    DM2  LBS : 158 at 12:44pm  No results found for: \"A1C\"     Levon Morgan 12:44 PM January 28, 2025            Last edited by Levon Morgan on 1/28/2025 12:44 PM.        Her last A1C was 6.2%.  Reports no eye discomfort.  No flashes/floaters.        PMH: diabetes mellitus 2 on oral meds, hyperlipidemia , cpap  Past Medical History:   Diagnosis Date    Diabetes (H)     High cholesterol     Hypertension       POH: Glasses for myopia/astig/presbyopia, cataracts both eyes, posterior vitreous detachment both eyes, no surgery, no trauma  Oc Meds: refresh at night in the past  FH: Denies any glaucoma, age related macular degeneration, or other known eye diseases         Assessment & Plan:    1. Type 2 diabetes mellitus without ophthalmic manifestations (H) - Both Eyes    2. Nuclear sclerotic cataract of both eyes - Both Eyes    3. PVD (posterior vitreous detachment), bilateral - Both Eyes    4. Myopia, bilateral - Both Eyes        (E11.9) Type 2 diabetes mellitus without ophthalmic manifestations (H) - Both Eyes  (primary encounter diagnosis)  Comment: Diabetes Mellitus 2 w/o retinopathy, good A1c 6.2%  Plan:   Discussed the importance of tight blood glucose, blood pressure, and cholesterol control in the prevention of diabetic retinopathy. Recommend yearly dilated eye exam.     Nuclear sclerotic cataract - Both Eyes  Comment: early nuclear sclerotic cataract,minimally visually significant, cortical cataract right eye, " counseled patient that it could cause mild glare/halos and refractive changes over time that can be compensated with new glasses   Plan: follow    (H52.13) Myopia, bilateral - Both Eyes  Comment: good visual acuity with current glasses   plan: Continue same glasses    (H43.819) PVD (posterior vitreous detachment), unspecified laterality - ou  Comment:  Stable    Plan:  Natural history of posterior vitreous detachment as well as retinal tear/detachment symptoms discussed with patient.  Told to call for prompt dilated exam if these symptoms occur.      -----------------------------------------------------------------------------------       Patient disposition:   Return in about 1 year (around 1/28/2026) for Comprehensive Exam. Patient to call sooner as needed.      Complete documentation of historical and exam elements from today's encounter can be found in the full encounter summary report (not reduplicated in this progress note). I personally obtained the chief complaint(s) and history of present illness.  I have confirmed and edited as necessary the CC, HPI, PMH/PSH, social history, FMH, ROS, and exam/neuro findings as obtained by the technician or others. I have examined this patient myself and I personally viewed the image(s) and studies listed above and the documentation reflects my findings and interpretation.     Lupe Soliz

## 2025-01-28 NOTE — NURSING NOTE
"Chief Complaints and History of Present Illnesses   Patient presents with    COMPREHENSIVE EYE EXAM     Here for a Comprehensive eye exam      Chief Complaint(s) and History of Present Illness(es)       COMPREHENSIVE EYE EXAM              Associated symptoms: Negative for dryness, eye pain, flashes and floaters    Treatments tried: no treatments    Pain scale: 0/10    Comments: Here for a Comprehensive eye exam               Comments    Pt states VA has been stable since last visit.   Happy with vision in current glasses.   Denies eye pain or discomfort.   Ocular Meds: None    DM2  LBS : 158 at 12:44pm  No results found for: \"A1C\"     Levon  12:44 PM January 28, 2025                    "

## 2025-02-09 ENCOUNTER — HEALTH MAINTENANCE LETTER (OUTPATIENT)
Age: 65
End: 2025-02-09

## 2025-06-07 ENCOUNTER — HEALTH MAINTENANCE LETTER (OUTPATIENT)
Age: 65
End: 2025-06-07

## 2025-08-30 ENCOUNTER — HEALTH MAINTENANCE LETTER (OUTPATIENT)
Age: 65
End: 2025-08-30